# Patient Record
Sex: MALE | Race: WHITE | NOT HISPANIC OR LATINO | Employment: FULL TIME | URBAN - METROPOLITAN AREA
[De-identification: names, ages, dates, MRNs, and addresses within clinical notes are randomized per-mention and may not be internally consistent; named-entity substitution may affect disease eponyms.]

---

## 2024-10-15 ENCOUNTER — OFFICE VISIT (OUTPATIENT)
Dept: URGENT CARE | Facility: CLINIC | Age: 24
End: 2024-10-15
Payer: COMMERCIAL

## 2024-10-15 VITALS
TEMPERATURE: 98.2 F | HEART RATE: 100 BPM | SYSTOLIC BLOOD PRESSURE: 114 MMHG | DIASTOLIC BLOOD PRESSURE: 70 MMHG | HEIGHT: 74 IN | OXYGEN SATURATION: 95 % | WEIGHT: 315 LBS | RESPIRATION RATE: 16 BRPM | BODY MASS INDEX: 40.43 KG/M2

## 2024-10-15 DIAGNOSIS — J02.9 ACUTE PHARYNGITIS, UNSPECIFIED ETIOLOGY: Primary | ICD-10-CM

## 2024-10-15 DIAGNOSIS — J02.9 SORE THROAT: ICD-10-CM

## 2024-10-15 LAB
POC GROUP A STREP, PCR: NOT DETECTED
POC RAPID MONO: NEGATIVE

## 2024-10-15 PROCEDURE — 86308 HETEROPHILE ANTIBODY SCREEN: CPT | Mod: QW

## 2024-10-15 PROCEDURE — 99213 OFFICE O/P EST LOW 20 MIN: CPT

## 2024-10-15 PROCEDURE — 87651 STREP A DNA AMP PROBE: CPT | Mod: QW

## 2024-10-15 RX ORDER — AMOXICILLIN 500 MG/1
500 CAPSULE ORAL 2 TIMES DAILY
Qty: 20 CAPSULE | Refills: 0 | Status: SHIPPED | OUTPATIENT
Start: 2024-10-15 | End: 2024-10-25

## 2024-10-15 RX ORDER — ALBUTEROL SULFATE 1.25 MG/3ML
1.25 SOLUTION RESPIRATORY (INHALATION) EVERY 6 HOURS PRN
COMMUNITY

## 2024-10-15 NOTE — PROGRESS NOTES
[facility] URGENT CARE [place] NOTE:      Name: Charles Bermeo, 24 y.o.    CSN:[id number]   MRN:[id number]    PCP: No Assigned PCP Generic Provider, MD    ALL:  No Known Allergies    History:    Chief Complaint: Sore Throat (Sore throat for 1.5 weeks. )    Encounter Date: 10/15/2024      HPI: The history was obtained from the patient. Charles is a 24 y.o. male, who presents with a chief complaint of Sore Throat (Sore throat for 1.5 weeks. ).  She presents with a sore throat times 1.5 weeks.  He states it does seem to improve throughout the day but then worsened at nighttime.  He has been taking over-the-counter medications with minimal relief in symptoms.  He denies fevers, chills, nausea, vomiting, congestion, cough, chest pain, shortness of breath, abdominal pain.    PMHx:    No past medical history on file.           Current Outpatient Medications   Medication Sig Dispense Refill    albuterol 1.25 mg/3 mL nebulizer solution Take 3 mL (1.25 mg) by nebulization every 6 hours if needed for wheezing.      amoxicillin (Amoxil) 500 mg capsule Take 1 capsule (500 mg) by mouth 2 times a day for 10 days. 20 capsule 0     No current facility-administered medications for this visit.         PMSx:  No past surgical history on file.    Fam Hx: No family history on file.    SOC. Hx:     Social History     Socioeconomic History    Marital status:      Spouse name: Not on file    Number of children: Not on file    Years of education: Not on file    Highest education level: Not on file   Occupational History    Not on file   Tobacco Use    Smoking status: Not on file    Smokeless tobacco: Not on file   Substance and Sexual Activity    Alcohol use: Not on file    Drug use: Not on file    Sexual activity: Not on file   Other Topics Concern    Not on file   Social History Narrative    Not on file     Social Determinants of Health     Financial Resource Strain: Not on file   Food Insecurity: Not on file    Transportation Needs: Not on file   Physical Activity: Not on file   Stress: Not on file   Social Connections: Not on file   Intimate Partner Violence: Not on file   Housing Stability: Not on file         Vitals:    10/15/24 1613   BP: 114/70   Pulse: 100   Resp: 16   Temp: 36.8 °C (98.2 °F)   SpO2: 95%     145 kg (320 lb)          Physical Exam  Vitals reviewed.   Constitutional:       General: He is not in acute distress.     Appearance: Normal appearance. He is not ill-appearing.   HENT:      Head: Normocephalic and atraumatic.      Right Ear: Tympanic membrane, ear canal and external ear normal.      Left Ear: Tympanic membrane, ear canal and external ear normal.      Nose: Nose normal. No congestion.      Mouth/Throat:      Mouth: Mucous membranes are moist.      Pharynx: Oropharynx is clear. Uvula midline. Posterior oropharyngeal erythema present. No pharyngeal swelling, oropharyngeal exudate or uvula swelling.      Tonsils: 2+ on the right. 2+ on the left.   Eyes:      Conjunctiva/sclera: Conjunctivae normal.      Pupils: Pupils are equal, round, and reactive to light.   Cardiovascular:      Rate and Rhythm: Normal rate and regular rhythm.      Pulses: Normal pulses.      Heart sounds: Normal heart sounds.   Pulmonary:      Effort: Pulmonary effort is normal.      Breath sounds: Normal breath sounds.   Musculoskeletal:      Cervical back: Full passive range of motion without pain, normal range of motion and neck supple.   Lymphadenopathy:      Cervical: No cervical adenopathy.      Right cervical: No superficial or posterior cervical adenopathy.     Left cervical: No superficial or posterior cervical adenopathy.   Skin:     General: Skin is warm.   Neurological:      General: No focal deficit present.      Mental Status: He is alert and oriented to person, place, and time.   Psychiatric:         Mood and Affect: Mood normal.         Behavior: Behavior normal.       I did personally review Charles's past  medical history, surgical history, social history, as well as family history (when relevant).  In this case, I also oversaw the his drug management by reviewing his medication list, allergy list, as well as the medications that I prescribed during the UC course and/or recommended as an out-patient (including possible OTC medications such as acetaminophen, NSAIDs , etc).    After reviewing the items above, I did look at previous medical documentation, such as recent hospitalizations, office visits, and/or recent consultations with PCP/specialist.                          SDOH:   Another factor that I considered in Charles's care was his Social Determinants of Health (SDOH). During this UC encounter, he did not have social determinants of health. Those SDOH influencing Charles's care are: none    LABORATORY @ RADIOLOGICAL IMAGING (if done):     Results for orders placed or performed in visit on 10/15/24 (from the past 24 hour(s))   POCT Group A Streptococcus, PCR manually resulted   Result Value Ref Range    POC Group A Strep, PCR Not Detected Not Detected   POCT Infectious mononucleosis antibody manually resulted   Result Value Ref Range    POC Rapid Mono Negative Negative       UC COURSE/MEDICAL DECISION MAKING:    Charles is a 24 y.o., who presents with a working diagnosis of   1. Acute pharyngitis, unspecified etiology    2. Sore throat      Charles was seen today for sore throat.  Diagnoses and all orders for this visit:  Acute pharyngitis, unspecified etiology (Primary)  -     amoxicillin (Amoxil) 500 mg capsule; Take 1 capsule (500 mg) by mouth 2 times a day for 10 days.  Sore throat  -     POCT Infectious mononucleosis antibody manually resulted  -     POCT Group A Streptococcus, PCR manually resulted    After my independent evaluation, he appears to have a illness likely due to acute pharyngitis. At this time, there is a no evidence of pneumonia, hypoxia, OM, bacterial bronchitis, bacteremia, or sepsis.     Strep and  "point-of-care mono testing both negative in clinic today.    As we discussed, he is to take the antibiotic as directed and is to return to our office or ER immediately if there is any worsening of his condition, such as increased cough, shortness of breath, persistent fevers, repeated vomiting, dehydration, or if his condition worsens at all.      Rita Ordoñez PA-C   Advanced Practice Provider  [facility]    Please note: Portions of this chart may have been created with Dragon voice recognition software. Occasional wrong-word or \"sound-like\" substitutions may have occurred due to inherent limitations of the voice recognition software. Please excuse any typographical or grammatical errors contained herein. Please read the chart carefully and recognize, using context, where the substitutions have occurred.   "